# Patient Record
Sex: MALE | Race: BLACK OR AFRICAN AMERICAN | Employment: OTHER | URBAN - METROPOLITAN AREA
[De-identification: names, ages, dates, MRNs, and addresses within clinical notes are randomized per-mention and may not be internally consistent; named-entity substitution may affect disease eponyms.]

---

## 2017-07-26 PROBLEM — R97.20 ELEVATED PROSTATE SPECIFIC ANTIGEN (PSA): Status: ACTIVE | Noted: 2017-07-26

## 2019-04-26 ENCOUNTER — HOSPITAL ENCOUNTER (EMERGENCY)
Age: 69
Discharge: HOME | End: 2019-04-26
Attending: EMERGENCY MEDICINE
Payer: MEDICARE

## 2019-04-26 VITALS
SYSTOLIC BLOOD PRESSURE: 123 MMHG | TEMPERATURE: 98.2 F | HEIGHT: 68 IN | OXYGEN SATURATION: 96 % | DIASTOLIC BLOOD PRESSURE: 91 MMHG | HEART RATE: 81 BPM | WEIGHT: 107 LBS | BODY MASS INDEX: 16.22 KG/M2 | RESPIRATION RATE: 15 BRPM

## 2019-04-26 DIAGNOSIS — M71.332 SYNOVIAL CYST OF WRIST, LEFT: Primary | ICD-10-CM

## 2019-04-26 PROCEDURE — 99282 EMERGENCY DEPT VISIT SF MDM: CPT

## 2019-04-26 NOTE — ED PROVIDER NOTES
EMERGENCY DEPARTMENT HISTORY AND PHYSICAL EXAM 
 
11:10 AM 
 
 
Date: 4/26/2019 Patient Name: Lay Brady History of Presenting Illness No chief complaint on file. HISTORY: Lay Brady is a 71 y.o. male with HTN, hypercholesterolemia who presents with swelling to his left hand that is been present for 1 month. Patient denies any known trauma or injury. It does not cause him pain. Nothing makes it better or worse. He denies previous history of this. Patient has not seen his primary care doctor in a long time and wanted to be evaluated to make sure that it was not an abscess. He is right-hand dominant. PCP: Darby Siemens, MD 
 
 
 
Past History Past Medical History: 
Past Medical History:  
Diagnosis Date  High cholesterol  Hypertension Past Surgical History: 
Past Surgical History:  
Procedure Laterality Date  HX OTHER SURGICAL    
 prostate biopsy Family History: 
History reviewed. No pertinent family history. Social History: 
Social History Tobacco Use  Smoking status: Current Every Day Smoker Types: Cigarettes  Smokeless tobacco: Never Used Substance Use Topics  Alcohol use: Not on file  Drug use: Not on file Allergies: 
No Known Allergies Review of Systems Review of Systems Constitutional: Negative for chills. HENT: Negative for congestion and sore throat. Respiratory: Negative for cough and shortness of breath. Cardiovascular: Negative for chest pain. Gastrointestinal: Negative for abdominal pain, diarrhea, nausea and vomiting. Genitourinary: Negative for dysuria. Musculoskeletal: Negative for back pain. Skin: Negative for rash. Neurological: Negative for dizziness and headaches. All other systems reviewed and are negative. Physical Exam  
 
Visit Vitals BP (!) 123/91 (BP 1 Location: Right arm, BP Patient Position: At rest) Pulse 81 Temp 98.2 °F (36.8 °C) Resp 15  
 Ht 5' 8\" (1.727 m) Wt 48.5 kg (107 lb) SpO2 96% BMI 16.27 kg/m² Physical Exam  
Musculoskeletal:  
     Arms: 
 
General Exam: Patient is a well developed and well nourished in no distress. Patient does not appear acutely ill or toxic. Pulmonary Exam: No respiratory distress. The respiratory rate is normal.  No stridor. The breath sounds are equal bilaterally. There are no wheezes, rales, or rhonchi noted. Cardiac Exam: The cardiac rate and rhythm are normal.  No significant murmurs, rubs, or gallops. The peripheral pulses of the upper extremities are normal. 
Abdominal Exam: Abdomen is soft and non-distended. There is no local tenderness. There is no rebound or guarding noted. Musculoskeletal: Round swelling to the volar aspect of left wrist -cystic on palpation, no overlying redness, no tenderness on palpation, no bony tenderness to hand or forearm, strong distal pulse, normal range of motion of hand and wrist 
Skin and Soft Tissue: The skin is warm and dry, Good color. Psychiatric: Normal adult with appropriate demeanor and interpersonal interaction. Is oriented to person, place, and time. Diagnostic Study Results Labs - No results found for this or any previous visit (from the past 12 hour(s)). Radiologic Studies - No orders to display Medical Decision Making I am the first provider for this patient. I reviewed the vital signs, available nursing notes, past medical history, past surgical history, family history and social history. Vital Signs-Reviewed the patient's vital signs. Records Reviewed: Nursing Notes (Time of Review: 11:10 AM) ED Course: Progress Notes, Reevaluation, and Consults: 
 
 
Provider Notes (Medical Decision Making): Patient with swelling to the volar aspect of his left wrist which is been present for 1 month.   Denies injury or trauma and no tenderness on exam.  No overlying redness or pain to suggest an abscess or infection. It does seem cystic on exam, could be a ganglion cyst.  Patient agrees with holding off on imaging at this time. Will refer to general surgeon for possible removal of growth. Discussed return precautions for any worsening symptoms. Diagnosis Clinical Impression: 1. Synovial cyst of wrist, left Disposition: DC Follow-up Information None Patient's Medications Start Taking No medications on file Continue Taking No medications on file These Medications have changed No medications on file Stop Taking ASPIRIN DELAYED-RELEASE 81 MG TABLET    Take  by mouth daily. ATORVASTATIN (LIPITOR) 20 MG TABLET    Take  by mouth daily. CARVEDILOL (COREG) 12.5 MG TABLET    Take  by mouth two (2) times daily (with meals). LISINOPRIL (PRINIVIL, ZESTRIL) 10 MG TABLET    Take  by mouth daily. _______________________________ Please note that this dictation was completed with [employer], the computer voice recognition software. Quite often unanticipated grammatical, syntax, homophones, and other interpretive errors are inadvertently transcribed by the computer software. Please disregard these errors. Please excuse any errors that have escaped final proofreading.